# Patient Record
Sex: FEMALE | Race: WHITE | Employment: UNEMPLOYED | URBAN - METROPOLITAN AREA
[De-identification: names, ages, dates, MRNs, and addresses within clinical notes are randomized per-mention and may not be internally consistent; named-entity substitution may affect disease eponyms.]

---
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
## 2021-12-01 PROBLEM — O99.810 ABNORMAL GLUCOSE TOLERANCE TEST IN PREGNANCY, ANTEPARTUM: Status: ACTIVE | Noted: 2019-12-18

## 2021-12-01 PROBLEM — K64.9 HEMORRHOIDS: Status: ACTIVE | Noted: 2019-07-26

## 2021-12-01 PROBLEM — F32.A DEPRESSION: Status: ACTIVE | Noted: 2019-07-01

## 2021-12-02 PROBLEM — Z91.199 NO-SHOW FOR APPOINTMENT: Status: ACTIVE | Noted: 2021-12-02
